# Patient Record
Sex: MALE | Race: WHITE | NOT HISPANIC OR LATINO | Employment: FULL TIME | RURAL
[De-identification: names, ages, dates, MRNs, and addresses within clinical notes are randomized per-mention and may not be internally consistent; named-entity substitution may affect disease eponyms.]

---

## 2017-05-09 PROBLEM — L30.9 PERIANAL DERMATITIS: Chronic | Status: ACTIVE | Noted: 2017-05-09

## 2017-05-09 PROBLEM — Z00.00 WELLNESS EXAMINATION: Status: ACTIVE | Noted: 2017-05-09

## 2017-05-09 PROBLEM — I10 HYPERTENSION: Status: ACTIVE | Noted: 2017-05-09

## 2017-05-09 PROBLEM — G47.33 OBSTRUCTIVE SLEEP APNEA: Status: ACTIVE | Noted: 2017-05-09

## 2017-05-09 PROBLEM — K64.9 HEMORRHOIDS: Status: ACTIVE | Noted: 2017-05-09

## 2017-05-09 PROBLEM — J30.9 ALLERGIC RHINITIS: Chronic | Status: ACTIVE | Noted: 2017-05-09

## 2017-05-09 PROBLEM — M67.40 GANGLION: Status: ACTIVE | Noted: 2017-05-09

## 2017-05-09 PROBLEM — E78.5 HYPERLIPIDEMIA: Chronic | Status: ACTIVE | Noted: 2017-05-09

## 2017-05-09 PROBLEM — H90.5 SENSORINEURAL HEARING LOSS: Chronic | Status: ACTIVE | Noted: 2017-05-09

## 2017-05-09 PROBLEM — K21.9 GASTROESOPHAGEAL REFLUX DISEASE: Chronic | Status: ACTIVE | Noted: 2017-05-09

## 2017-05-09 PROBLEM — D69.6 THROMBOCYTOPENIA (HCC): Status: ACTIVE | Noted: 2017-05-09

## 2017-05-09 PROBLEM — M47.9 DEGENERATIVE JOINT DISEASE OF SPINE: Status: ACTIVE | Noted: 2017-05-09

## 2017-05-09 PROBLEM — N52.9 ERECTILE DYSFUNCTION: Status: ACTIVE | Noted: 2017-05-09

## 2017-05-09 PROBLEM — R73.01 ELEVATED FASTING GLUCOSE: Status: ACTIVE | Noted: 2017-05-09

## 2017-05-10 ENCOUNTER — OFFICE VISIT (OUTPATIENT)
Dept: FAMILY MEDICINE CLINIC | Facility: CLINIC | Age: 42
End: 2017-05-10

## 2017-05-10 VITALS
TEMPERATURE: 98.4 F | SYSTOLIC BLOOD PRESSURE: 108 MMHG | OXYGEN SATURATION: 98 % | BODY MASS INDEX: 22.26 KG/M2 | HEIGHT: 75 IN | HEART RATE: 88 BPM | RESPIRATION RATE: 16 BRPM | WEIGHT: 179 LBS | DIASTOLIC BLOOD PRESSURE: 86 MMHG

## 2017-05-10 DIAGNOSIS — Z51.81 THERAPEUTIC DRUG MONITORING: Primary | ICD-10-CM

## 2017-05-10 PROBLEM — J30.9 ALLERGIC RHINITIS: Chronic | Status: ACTIVE | Noted: 2017-05-10

## 2017-05-10 PROBLEM — Z72.0 TOBACCO USE: Chronic | Status: ACTIVE | Noted: 2017-05-10

## 2017-05-10 PROBLEM — N43.3 HYDROCELE: Status: ACTIVE | Noted: 2017-05-10

## 2017-05-10 PROBLEM — F19.20 CHEMICAL DEPENDENCY (HCC): Status: ACTIVE | Noted: 2017-05-10

## 2017-05-10 PROBLEM — H61.20 EXCESSIVE CERUMEN IN EAR CANAL: Status: ACTIVE | Noted: 2017-05-10

## 2017-05-10 PROBLEM — R22.1 NECK MASS: Status: ACTIVE | Noted: 2017-05-10

## 2017-05-10 PROBLEM — F41.9 ANXIETY: Chronic | Status: ACTIVE | Noted: 2017-05-10

## 2017-05-10 PROCEDURE — 99213 OFFICE O/P EST LOW 20 MIN: CPT | Performed by: FAMILY MEDICINE

## 2017-05-10 RX ORDER — LIDOCAINE 50 MG/G
1 OINTMENT TOPICAL DAILY PRN
Refills: 12 | COMMUNITY
Start: 2017-04-05 | End: 2020-03-09

## 2017-05-10 RX ORDER — BUPRENORPHINE HYDROCHLORIDE, NALOXONE HYDROCHLORIDE 8; 2 MG/1; MG/1
1 FILM, SOLUBLE BUCCAL; SUBLINGUAL DAILY
Refills: 0 | COMMUNITY
Start: 2017-05-02 | End: 2020-03-25

## 2018-11-20 ENCOUNTER — TELEPHONE (OUTPATIENT)
Dept: FAMILY MEDICINE CLINIC | Facility: CLINIC | Age: 43
End: 2018-11-20

## 2018-11-20 RX ORDER — FLUTICASONE PROPIONATE 50 MCG
1 SPRAY, SUSPENSION (ML) NASAL DAILY
Qty: 1 BOTTLE | Refills: 0 | Status: SHIPPED | OUTPATIENT
Start: 2018-11-20 | End: 2020-03-25

## 2018-11-20 RX ORDER — AMOXICILLIN AND CLAVULANATE POTASSIUM 875; 125 MG/1; MG/1
1 TABLET, FILM COATED ORAL 2 TIMES DAILY
Qty: 20 TABLET | Refills: 0 | Status: SHIPPED | OUTPATIENT
Start: 2018-11-20 | End: 2020-03-09

## 2018-11-20 NOTE — TELEPHONE ENCOUNTER
He says he has a sinus infection that won't go away. No fever. His head and chest is congested. Coughing up yellowish green mucous. Taking robitussin and mucinex. Has had this for 1 week. NKDA. Would like something sent to [facility]

## 2018-11-20 NOTE — TELEPHONE ENCOUNTER
Augmentin 875 bid #20  flonase NS one puff each nostril qd  If not better with this; needs to come in

## 2020-03-09 ENCOUNTER — OFFICE VISIT (OUTPATIENT)
Dept: FAMILY MEDICINE CLINIC | Facility: CLINIC | Age: 45
End: 2020-03-09

## 2020-03-09 VITALS
WEIGHT: 180 LBS | BODY MASS INDEX: 22.38 KG/M2 | HEART RATE: 100 BPM | HEIGHT: 75 IN | RESPIRATION RATE: 16 BRPM | SYSTOLIC BLOOD PRESSURE: 120 MMHG | TEMPERATURE: 98.3 F | DIASTOLIC BLOOD PRESSURE: 88 MMHG

## 2020-03-09 DIAGNOSIS — G47.00 INSOMNIA, UNSPECIFIED TYPE: ICD-10-CM

## 2020-03-09 DIAGNOSIS — F19.20 CHEMICAL DEPENDENCY (HCC): ICD-10-CM

## 2020-03-09 DIAGNOSIS — F11.93 NARCOTIC WITHDRAWAL (HCC): ICD-10-CM

## 2020-03-09 PROCEDURE — 99213 OFFICE O/P EST LOW 20 MIN: CPT | Performed by: FAMILY MEDICINE

## 2020-03-09 RX ORDER — CLONAZEPAM 1 MG/1
1 TABLET ORAL 2 TIMES DAILY PRN
Qty: 30 TABLET | Refills: 0 | Status: SHIPPED | OUTPATIENT
Start: 2020-03-09 | End: 2020-03-25 | Stop reason: SDUPTHER

## 2020-03-09 RX ORDER — IBUPROFEN 200 MG
400 TABLET ORAL EVERY 6 HOURS PRN
COMMUNITY
End: 2022-03-07

## 2020-03-09 NOTE — PROGRESS NOTES
"Subjective   Raad Carvajal is a 44 y.o. male presenting with chief complaint of:   Chief Complaint   Patient presents with   • Medication Problem     \"Im on day 16 off suboxone and not doing very well\"       History of Present Illness :  Alone.  Here for primarily an acute issue today; he needs some help with insomnia.  Find it difficult to fall and stay asleep.  2 to 3 weeks ago he decided to stop Suboxone; he was on 60 mg.  He is used that 2 to 3 years.  Prior to that he had a narcotic addiction issue.  He simply tired of using the Suboxone his insurance does not cover it anymore.  He went through a period of mild to moderate anxiety some sweating some loose stools and of course he is having increased difficulty of various discomforts.  But he thinks he is going to be able to tolerate everything eventually.  His primary emphasis is simply having something to help him get some rest at night.    Has multiple chronic problems to consider that might have a bearing on today's issues; not an interval appointment.       Chronic/acute problems reviewed today:   1. Chemical dependency-narcotic: chronic/variable as he is weaning and having symptoms of withdrawal but he says he will not go back to using narcotics.    2. Narcotic withdrawal (CMS/[facility]): see above   3. Insomnia, unspecified type: acute/chronic: denies sleep apnea.  Trouble with falling/staying asleep as more anxious/difficult to relax.  alittle more diffuse pains.      Has an/another acute issue today: non e.    The following portions of the patient's history were reviewed and updated as appropriate: allergies, current medications, past family history, past medical history, past social history, past surgical history and problem list.      Current Outpatient Medications:   •  fluticasone (FLONASE) 50 MCG/ACT nasal spray, 1 spray by Each Nare route Daily., Disp: 1 bottle, Rfl: 0  •  lidocaine (XYLOCAINE) 5 % ointment, Apply 1 application topically Daily As " Needed for Itching or Pain., Disp: , Rfl: 12  •  SUBOXONE 8-2 MG film film, Place 1 film under the tongue Daily., Disp: , Rfl: 0    No problems with medications.  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  ENDO:  No syncope, near or diaphoretic sweaty spells.  HEENT: No head injury occ/same headache.   No vision change.  No hearing loss.  Ears without pain/drainage.  No sore throat.  No change occ nasal/sinus congestion/drainage. No epistaxis.  CHEST: No chest wall tenderness or mass. No change/still smoking occ cough,  without wheeze, SOB; no hemoptysis.  CV: No chest pain, palpatations, ankle edema.  GI: No heartburn,or dysphagia.  No abdominal pain, constipation, rectal bleeding, or melena; recent on/off looser stools.    :  Voids without dysuria, or incontience to completion.  ORTHO: No painful/swollen joints but various on /off sore.  No change daily sore neck or back.  No acute neck or back pain without recent injury.   NEURO: No dizziness, weakness of extremities.  No numbness/parethesias.   PSYCH: No memory loss.  Mood good; occ anxious, depressed but/and not suicidal.  Tolerated stress.     Screening:  Mammogram: NA  Bone density: NA  Low dose CT chest: Tobacco-smoker/age 24/1 ppd  GI: NA  Prostate: NA  Usual lab order  NA    Lab Results:  No results found for this or any previous visit.    A1C:No results for input(s): HGBA1C in the last 78264 hours.  PSA:No results for input(s): PSA in the last 81239 hours.  CBC:No results for input(s): WBC, HGB, HCT, PLT, IRONSERUM, IRON in the last 73607 hours.   BMP/CMP:No results for input(s): NA, K, CL, CO2, GLU, BUN, CREATININE, EGFRIFNONA, EGFRIFAFRI, CALCIUM in the last 91969 hours.  HEPATIC:No results for input(s): ALT, AST, ALKPHOS, TOTAL in the last 41312 hours.    Invalid input(s): HEPATITSC  THYROID:No results for input(s): TSH, T3FREE,  "FTI in the last 75296 hours.    Invalid input(s): T4FREE, T3, T4, TEUP,  TOTALT4    Objective   /88 (BP Location: Right arm, Patient Position: Sitting, Cuff Size: Large Adult)   Pulse 100   Temp 98.3 °F (36.8 °C) (Oral)   Resp 16   Ht 190.5 cm (75\")   Wt 81.6 kg (180 lb)   BMI 22.50 kg/m²   Body mass index is 22.5 kg/m².    Physical Exam  GENERAL:  Well nourished/developed in no acute distress. Thin  SKIN: Turgor excellent, without wound, rash, lesion.  HEENT: Normal cephalic without trauma.  Pupils equal round reactive to light. Extraocular motions full without nystagmus.    No thyroidmegaly, mass, tenderness, lymphadenopathy and supple.  CV: Regular rhythm.  No murmur, gallop, edema. Posterior pulses intact.  No carotid bruits.  CHEST: No chest wall tenderness or mass.   LUNGS: Symmetric motion with clear to auscultation.   ABD: Soft, nontender without mass.   ORTHO: Symmetric extremities without swelling/point tenderness.  Full gross range of motion.  NEURO: CN 2-12 grossly intact.  Symmetric facies.  UE/LE   4/5 strength throughout.  Nonfocal use extremities. Speech clear.    PSYCH: Oriented x 3.  Pleasant calm, well kept.  Purposeful/directed conservation with intact short/long gross memory.     Assessment/Plan     1. Chemical dependency-narcotic    2. Narcotic withdrawal (CMS/[facility])    3. Insomnia, unspecified type        Rx: reviewed/changes:  New Medications Ordered This Visit   Medications   • clonazePAM (KLONOPIN) 1 MG tablet     Sig: Take 1 tablet by mouth 2 (Two) Times a Day As Needed for Seizures.     Dispense:  30 tablet     Refill:  0     LAB/Testing/Referrals: reviewed/orders:   Today:   No orders of the defined types were placed in this encounter.    Chronic/recurrent labs above or change to:   none     Discussions:   Brief use of klonopin; start 1mg may increase 2 mg (avoid sedation)  If goes on very long; more insomnia testing/etc: return  Body mass index is 22.5 kg/m².  Patient's Body " mass index is 22.5 kg/m². BMI is within normal parameters. No follow-up required..      Tobacco use reviewed:    Smoker  Raad Carvajal  reports that he has been smoking cigarettes. He started smoking about 21 years ago. He has been smoking about 1.00 pack per day. He has never used smokeless tobacco.. I have educated him on the risk of diseases from using tobacco products such as cancer, COPD and heart diease.     I advised him to quit and he is not willing to quit.    I spent 1m minutes counseling the patient.        There are no Patient Instructions on file for this visit.    Follow up: Return for will see how testing/or treatment goes and decide;.  Future Appointments   Date Time Provider Department Center   3/25/2020 10:30 AM Jose Angel Shankar MD [facility] None

## 2020-03-25 ENCOUNTER — OFFICE VISIT (OUTPATIENT)
Dept: FAMILY MEDICINE CLINIC | Facility: CLINIC | Age: 45
End: 2020-03-25

## 2020-03-25 VITALS
RESPIRATION RATE: 16 BRPM | HEART RATE: 90 BPM | DIASTOLIC BLOOD PRESSURE: 88 MMHG | TEMPERATURE: 98.2 F | BODY MASS INDEX: 22.38 KG/M2 | HEIGHT: 75 IN | WEIGHT: 180 LBS | SYSTOLIC BLOOD PRESSURE: 122 MMHG | OXYGEN SATURATION: 97 %

## 2020-03-25 DIAGNOSIS — G47.00 INSOMNIA, UNSPECIFIED TYPE: ICD-10-CM

## 2020-03-25 DIAGNOSIS — F41.9 ANXIETY: Chronic | ICD-10-CM

## 2020-03-25 DIAGNOSIS — F19.20 CHEMICAL DEPENDENCY (HCC): Primary | ICD-10-CM

## 2020-03-25 DIAGNOSIS — M54.2 CHRONIC NECK PAIN: ICD-10-CM

## 2020-03-25 DIAGNOSIS — F11.93 NARCOTIC WITHDRAWAL (HCC): ICD-10-CM

## 2020-03-25 DIAGNOSIS — G89.29 CHRONIC NECK PAIN: ICD-10-CM

## 2020-03-25 PROCEDURE — 99214 OFFICE O/P EST MOD 30 MIN: CPT | Performed by: FAMILY MEDICINE

## 2020-03-25 RX ORDER — CLONAZEPAM 1 MG/1
1 TABLET ORAL 2 TIMES DAILY PRN
Qty: 30 TABLET | Refills: 0 | Status: SHIPPED | OUTPATIENT
Start: 2020-03-25 | End: 2021-04-26

## 2020-03-26 NOTE — PROGRESS NOTES
Subjective   Raad Carvajal is a 44 y.o. male presenting with chief complaint of:   Chief Complaint   Patient presents with   • Insomnia     still some trouble sleeping, but is getting better       History of Present Illness :  Alone.  Here for primarily f/u 3.9.20:    1. Chemical dependency-narcotic    2. Narcotic withdrawal (CMS/HCC)    3. Insomnia, unspecified type       He had a previous narcotic problem.  He came off of that and was able to get into the Suboxone program in [place].  He was recently notified his insurance is no longer going to pay for this and he decided he had to stop this.  When seen on 3/9/2020 he was having anxiety insomnia and other symptoms of the withdrawal process.  He did not look admittable; we started him on Klonopin 1 mg twice daily as needed and he has had to use it twice a day.  It was significantly helpful in dealing with his anxiety mild diaphoresis insomnia and bringing him to the point today where he is feeling close to normal.     Has few chronic problems to consider that might have a bearing on today's issues; not an interval appointment.       Chronic/acute problems reviewed today:   1. Chemical dependency-narcotic: chronic/past and as above.  Not using/desiring narcotics right now.     2. Anxiety: acute/chronic and above recently worse.  Better now with Rx   3. Chronic neck pain: : chronic/variable 1-3/10 neck/UE pain with infrequent/no radiation to UE/LE  No change any numbness.  Has bladder/bowel control. No desire to change approach of care.       4. Narcotic withdrawal (CMS/HCC): acute/above. Symptoms as much improved.    5. Insomnia, unspecified type: chronic/variable with above; sleeping well now with Rx.  Apnea denied     Has an/another acute issue today: none.    The following portions of the patient's history were reviewed and updated as appropriate: allergies, current medications, past family history, past medical history, past social history, past surgical  "history and problem list.      Current Outpatient Medications:   •  clonazePAM (KlonoPIN) 1 MG tablet, Take 1 tablet by mouth 2 (Two) Times a Day As Needed for Seizures. Goal; move bid prn to one a day then off, Disp: 30 tablet, Rfl: 0  •  Magnesium Hydroxide (MAGNESIA PO), Take  by mouth Daily. \"dont know the the dose\", Disp: , Rfl:   •  ibuprofen (ADVIL,MOTRIN) 200 MG tablet, Take 400 mg by mouth Every 6 (Six) Hours As Needed for Mild Pain  or Headache., Disp: , Rfl:     No problems with medications.  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  ENDO:  No syncope, near or diaphoretic sweaty spells.  HEENT: No head injury occ/same headache.   No vision change.  No hearing loss.  Ears without pain/drainage.  No sore throat.  No change occ nasal/sinus congestion/drainage. No epistaxis.  CHEST: No chest wall tenderness or mass. No change/still smoking occ cough,  without wheeze, SOB; no hemoptysis.  CV: No chest pain, palpatations, ankle edema.  GI: No heartburn,or dysphagia.  No abdominal pain, constipation, rectal bleeding, or melena; recent on/off looser stools.    :  Voids without dysuria, or incontience to completion.  ORTHO: No painful/swollen joints but various on /off sore.  No change daily sore neck or back.  No acute neck or back pain without recent injury.   NEURO: No dizziness, weakness of extremities.  No numbness/parethesias.   PSYCH: No memory loss.  Mood good; occ anxious, depressed but/and not suicidal.  Tolerated stress.      Screening:  Mammogram: NA  Bone density: NA  Low dose CT chest: Tobacco-smoker/age 24/1 ppd (20): NA  GI: NA  Prostate: NA  Usual lab order  NA    Lab Results:  No results found for this or any previous visit.    A1C:No results for input(s): HGBA1C in the last 81289 hours.  PSA:No results for input(s): PSA in the last 22399 hours.  CBC:No results for " "input(s): WBC, HGB, HCT, PLT, IRONSERUM, IRON in the last 20996 hours.   BMP/CMP:No results for input(s): NA, K, CL, CO2, GLU, BUN, CREATININE, EGFRIFNONA, EGFRIFAFRI, CALCIUM in the last 52301 hours.  HEPATIC:No results for input(s): ALT, AST, ALKPHOS, TOTAL in the last 92305 hours.    Invalid input(s): HEPATITSC  THYROID:No results for input(s): TSH, T3FREE, FTI in the last 16993 hours.    Invalid input(s): T4FREE, T3, T4, TEUP,  TOTALT4    Objective   /88   Pulse 90   Temp 98.2 °F (36.8 °C)   Resp 16   Ht 190.5 cm (75\")   Wt 81.6 kg (180 lb)   SpO2 97%   BMI 22.50 kg/m²   Body mass index is 22.5 kg/m².    Physical Exam  GENERAL:  Well nourished/developed in no acute distress. Thin  SKIN: Turgor excellent, without wound, rash, lesion.  HEENT: Normal cephalic without trauma.  Pupils equal round reactive to light. Extraocular motions full without nystagmus.    No thyroidmegaly, mass, tenderness, lymphadenopathy and supple.  CV: Regular rhythm.  No murmur, gallop, edema. Posterior pulses intact.  No carotid bruits.  CHEST: No chest wall tenderness or mass.   LUNGS: Symmetric motion with clear to auscultation.   ABD: Soft, nontender without mass.   ORTHO: Symmetric extremities without swelling/point tenderness.  Full gross range of motion.  NEURO: CN 2-12 grossly intact.  Symmetric facies.  UE/LE   4/5 strength throughout.  Nonfocal use extremities. Speech clear.    PSYCH: Oriented x 3.  Pleasant calm, well kept.  Purposeful/directed conservation with intact short/long gross memory.     Assessment/Plan     1. Chemical dependency-narcotic    2. Anxiety    3. Chronic neck pain    4. Narcotic withdrawal (CMS/HCC)    5. Insomnia, unspecified type        Rx: reviewed/changes:  New Medications Ordered This Visit   Medications   • clonazePAM (KlonoPIN) 1 MG tablet     Sig: Take 1 tablet by mouth 2 (Two) Times a Day As Needed for Seizures. Goal; move bid prn to one a day then off     Dispense:  30 tablet     " Refill:  0     LAB/Testing/Referrals: reviewed/orders:   Today: none  No orders of the defined types were placed in this encounter.    Chronic/recurrent labs above or change to:   none      Discussions:   Goal; wean Klonopin slowly  Body mass index is 22.5 kg/m².  Patient's Body mass index is 22.5 kg/m². BMI is within normal parameters. No follow-up required..    Tobacco use reviewed:    Smoker  Raad Carvajal  reports that he has been smoking cigarettes. He started smoking about 21 years ago. He has been smoking about 1.00 pack per day. He has never used smokeless tobacco.. I have educated him on the risk of diseases from using tobacco products such as cancer, COPD and heart diease.     I advised him to quit and he is not willing to quit.    I spent mentioned today as discussed many times before minutes counseling the patient.    There are no Patient Instructions on file for this visit.    Follow up: Return for Dr Shankar-, as needed;.  No future appointments.

## 2020-05-18 ENCOUNTER — TELEMEDICINE (OUTPATIENT)
Dept: FAMILY MEDICINE CLINIC | Facility: CLINIC | Age: 45
End: 2020-05-18

## 2020-05-18 DIAGNOSIS — S02.5XXA CLOSED FRACTURE OF TOOTH, INITIAL ENCOUNTER: Primary | ICD-10-CM

## 2020-05-18 DIAGNOSIS — K04.7 DENTAL INFECTION: ICD-10-CM

## 2020-05-18 PROCEDURE — 99213 OFFICE O/P EST LOW 20 MIN: CPT | Performed by: NURSE PRACTITIONER

## 2020-05-18 RX ORDER — CLINDAMYCIN HYDROCHLORIDE 300 MG/1
300 CAPSULE ORAL 3 TIMES DAILY
Qty: 30 CAPSULE | Refills: 0 | Status: SHIPPED | OUTPATIENT
Start: 2020-05-18 | End: 2020-05-28

## 2020-05-18 NOTE — PROGRESS NOTES
"Subjective   Chief Complaint:  Dental issue    History of Present Illness:  This 44 y.o. male was seen via telemedicine MyChart video conference today for left lower gum swelling with a fractured tooth.  He reports that his dentist has been out of service due to Covid 19.  Reports an acute flareup 2-3 days ago and has been using Motrin with good pain relief but still has an underlying dental abscess and fractured tooth.  He is requesting a referral to get it cut out.    No Known Allergies   Current Outpatient Medications on File Prior to Visit   Medication Sig   • clonazePAM (KlonoPIN) 1 MG tablet Take 1 tablet by mouth 2 (Two) Times a Day As Needed for Seizures. Goal; move bid prn to one a day then off   • ibuprofen (ADVIL,MOTRIN) 200 MG tablet Take 400 mg by mouth Every 6 (Six) Hours As Needed for Mild Pain  or Headache.   • Magnesium Hydroxide (MAGNESIA PO) Take  by mouth Daily. \"dont know the the dose\"     No current facility-administered medications on file prior to visit.       Past Medical, Surgical, Social, and Family History:  No past medical history on file.  No past surgical history on file.  Social History     Socioeconomic History   • Marital status: Single     Spouse name: Not on file   • Number of children: 0   • Years of education: 16   • Highest education level: Not on file   Occupational History   • Occupation: farmer   Tobacco Use   • Smoking status: Current Every Day Smoker     Packs/day: 1.00     Types: Cigarettes     Start date: 9/4/1998   • Smokeless tobacco: Never Used   Substance and Sexual Activity   • Alcohol use: No   • Drug use: No   • Sexual activity: Not Currently     No family history on file.  Review of Systems   Constitutional: Negative for chills and fever.   HENT: Positive for dental problem.    Respiratory: Negative for cough and shortness of breath.    Cardiovascular: Negative for chest pain and palpitations.   Musculoskeletal: Negative for arthralgias and myalgias. "     Objective   Physical Exam  Assessment/Plan   Diagnoses and all orders for this visit:    1. Closed fracture of tooth, initial encounter (Primary)  -     Ambulatory Referral to Oral Maxillofacial Surgery    2. Dental infection  -     Ambulatory Referral to Oral Maxillofacial Surgery  -     clindamycin (CLEOCIN) 300 MG capsule; Take 1 capsule by mouth 3 (Three) Times a Day for 10 days.  Dispense: 30 capsule; Refill: 0    Discussion:  Advised and educated plan of care.  Referral to oral surgery, advised to keep regular dentist informed when he is back in service, antibiotics sent to take care of the abscess to buy time to get to an appointment.    Follow-up:  No follow-ups on file.    Note e-Signed by NELSY Mcrae on 05/18/2020 at 10:37 AM

## 2021-04-26 ENCOUNTER — OFFICE VISIT (OUTPATIENT)
Dept: FAMILY MEDICINE CLINIC | Facility: CLINIC | Age: 46
End: 2021-04-26

## 2021-04-26 ENCOUNTER — TELEPHONE (OUTPATIENT)
Dept: FAMILY MEDICINE CLINIC | Facility: CLINIC | Age: 46
End: 2021-04-26

## 2021-04-26 VITALS
SYSTOLIC BLOOD PRESSURE: 138 MMHG | HEIGHT: 75 IN | DIASTOLIC BLOOD PRESSURE: 82 MMHG | HEART RATE: 77 BPM | TEMPERATURE: 97.7 F | RESPIRATION RATE: 16 BRPM | WEIGHT: 201 LBS | OXYGEN SATURATION: 99 % | BODY MASS INDEX: 24.99 KG/M2

## 2021-04-26 DIAGNOSIS — K04.7 DENTAL INFECTION: Primary | ICD-10-CM

## 2021-04-26 DIAGNOSIS — K02.9 DENTAL CARIES: ICD-10-CM

## 2021-04-26 PROCEDURE — 99213 OFFICE O/P EST LOW 20 MIN: CPT | Performed by: NURSE PRACTITIONER

## 2021-04-26 RX ORDER — CLINDAMYCIN HYDROCHLORIDE 300 MG/1
300 CAPSULE ORAL 3 TIMES DAILY
Qty: 30 CAPSULE | Refills: 0 | Status: SHIPPED | OUTPATIENT
Start: 2021-04-26 | End: 2021-05-06

## 2021-04-26 RX ORDER — AMOXICILLIN 500 MG/1
1000 CAPSULE ORAL 3 TIMES DAILY
Qty: 21 CAPSULE | Refills: 0 | Status: SHIPPED | OUTPATIENT
Start: 2021-04-26 | End: 2022-03-07

## 2021-04-26 NOTE — PROGRESS NOTES
"Subjective   Chief Complaint:  Gum and teeth pain    History of Present Illness:  This 45 y.o. male was seen in the office today.  He reports lower left gum started swelling last Saturday night.  He has a history of dental caries, he goes to the dentist in a couple days.    No Known Allergies   Current Outpatient Medications on File Prior to Visit   Medication Sig   • ibuprofen (ADVIL,MOTRIN) 200 MG tablet Take 400 mg by mouth Every 6 (Six) Hours As Needed for Mild Pain  or Headache.   • amoxicillin (AMOXIL) 500 MG capsule Take 2 capsules by mouth 3 (Three) Times a Day.   • [DISCONTINUED] clonazePAM (KlonoPIN) 1 MG tablet Take 1 tablet by mouth 2 (Two) Times a Day As Needed for Seizures. Goal; move bid prn to one a day then off   • [DISCONTINUED] Magnesium Hydroxide (MAGNESIA PO) Take  by mouth Daily. \"dont know the the dose\"     No current facility-administered medications on file prior to visit.      Past Medical, Surgical, Social, and Family History:  History reviewed. No pertinent past medical history.  History reviewed. No pertinent surgical history.  Social History     Socioeconomic History   • Marital status: Single     Spouse name: Not on file   • Number of children: 0   • Years of education: 16   • Highest education level: Not on file   Tobacco Use   • Smoking status: Current Every Day Smoker     Packs/day: 1.00     Types: Cigarettes     Start date: 9/4/1998   • Smokeless tobacco: Never Used   Substance and Sexual Activity   • Alcohol use: No   • Drug use: No   • Sexual activity: Not Currently     History reviewed. No pertinent family history.  Objective   Physical Exam  Constitutional:       General: He is not in acute distress.  HENT:      Mouth/Throat:      Dentition: Dental tenderness and dental caries present.   Cardiovascular:      Rate and Rhythm: Normal rate and regular rhythm.   Pulmonary:      Effort: Pulmonary effort is normal.      Breath sounds: Normal breath sounds.   Neurological:      Mental " "Status: He is alert.     /82 (BP Location: Left arm)   Pulse 77   Temp 97.7 °F (36.5 °C)   Resp 16   Ht 190.5 cm (75\")   Wt 91.2 kg (201 lb)   SpO2 99%   BMI 25.12 kg/m²     Assessment/Plan   Diagnoses and all orders for this visit:    1. Dental infection (Primary)  -     clindamycin (CLEOCIN) 300 MG capsule; Take 1 capsule by mouth 3 (Three) Times a Day for 10 days.  Dispense: 30 capsule; Refill: 0    2. Dental caries  -     clindamycin (CLEOCIN) 300 MG capsule; Take 1 capsule by mouth 3 (Three) Times a Day for 10 days.  Dispense: 30 capsule; Refill: 0    Discussion:  Advised and educated plan of care.  We will treat underlying infections, advised to follow-up with dentist as ordered.    Follow-up:  Return if symptoms worsen or fail to improve.    Electronically signed by NELSY Mcrae, 04/26/21, 3:26 PM CDT.  "

## 2022-03-07 ENCOUNTER — OFFICE VISIT (OUTPATIENT)
Dept: FAMILY MEDICINE CLINIC | Facility: CLINIC | Age: 47
End: 2022-03-07

## 2022-03-07 VITALS
HEIGHT: 75 IN | SYSTOLIC BLOOD PRESSURE: 142 MMHG | BODY MASS INDEX: 25.12 KG/M2 | OXYGEN SATURATION: 98 % | DIASTOLIC BLOOD PRESSURE: 86 MMHG | RESPIRATION RATE: 16 BRPM | HEART RATE: 76 BPM | TEMPERATURE: 97.8 F | WEIGHT: 202 LBS

## 2022-03-07 DIAGNOSIS — K04.7 DENTAL INFECTION: Primary | ICD-10-CM

## 2022-03-07 DIAGNOSIS — K02.9 DENTAL CARIES: ICD-10-CM

## 2022-03-07 PROCEDURE — 99213 OFFICE O/P EST LOW 20 MIN: CPT | Performed by: NURSE PRACTITIONER

## 2022-03-07 RX ORDER — CLINDAMYCIN HYDROCHLORIDE 300 MG/1
300 CAPSULE ORAL 3 TIMES DAILY
Qty: 30 CAPSULE | Refills: 0 | Status: SHIPPED | OUTPATIENT
Start: 2022-03-07 | End: 2022-03-17

## 2022-03-07 NOTE — PROGRESS NOTES
Subjective   Chief Complaint:  Flareup of dental problems    History of Present Illness:  This 46 y.o. male was seen in the office today.  He presents today with a flareup of dental problems.  He has a history of dental caries and frequent infections.  He does have plans of getting to a dentist very soon already.  He has taken Motrin for pain-declining any further assistance with pain.  He is mostly concerned about there being infection prior to getting dental work done.    No Known Allergies   Current Outpatient Medications on File Prior to Visit   Medication Sig   • [DISCONTINUED] amoxicillin (AMOXIL) 500 MG capsule Take 2 capsules by mouth 3 (Three) Times a Day.   • [DISCONTINUED] ibuprofen (ADVIL,MOTRIN) 200 MG tablet Take 400 mg by mouth Every 6 (Six) Hours As Needed for Mild Pain  or Headache.     No current facility-administered medications on file prior to visit.      Past Medical, Surgical, Social, and Family History:  No past medical history on file.  No past surgical history on file.  Social History     Socioeconomic History   • Marital status: Single   • Number of children: 0   • Years of education: 16   Tobacco Use   • Smoking status: Current Every Day Smoker     Packs/day: 1.00     Types: Cigarettes     Start date: 9/4/1998   • Smokeless tobacco: Never Used   Substance and Sexual Activity   • Alcohol use: No   • Drug use: No   • Sexual activity: Not Currently     No family history on file.  Objective   Physical Exam  Constitutional:       General: He is not in acute distress.  HENT:      Mouth/Throat:        Comments: Infected gum, fractured tooth  Cardiovascular:      Rate and Rhythm: Normal rate and regular rhythm.      Pulses: Normal pulses.      Heart sounds: No murmur heard.    No friction rub. No gallop.   Pulmonary:      Effort: Pulmonary effort is normal. No respiratory distress.      Breath sounds: Normal breath sounds. No wheezing or rhonchi.   Neurological:      Mental Status: He is alert.  "    /86   Pulse 76   Temp 97.8 °F (36.6 °C)   Resp 16   Ht 190.5 cm (75\")   Wt 91.6 kg (202 lb)   SpO2 98%   BMI 25.25 kg/m²     Assessment/Plan   Diagnoses and all orders for this visit:    1. Dental infection (Primary)  -     clindamycin (CLEOCIN) 300 MG capsule; Take 1 capsule by mouth 3 (Three) Times a Day for 10 days.  Dispense: 30 capsule; Refill: 0    2. Dental caries  -     clindamycin (CLEOCIN) 300 MG capsule; Take 1 capsule by mouth 3 (Three) Times a Day for 10 days.  Dispense: 30 capsule; Refill: 0    Discussion:  Advised and educated plan of care.  Antibiotics to follow-advised to follow-up with dentist ASAP.    Patient's Body mass index is 25.25 kg/m². indicating that he is overweight (BMI 25-29.9). Patient's (Body mass index is 25.25 kg/m².) indicates that they are overweight with health conditions that include none . Weight is unchanged. BMI is is above average; BMI management plan is completed. We discussed portion control and increasing exercise. .    Follow-up:  No follow-ups on file.    Electronically signed by NELSY Mcrae, 03/07/22, 8:39 AM CST.  "

## 2022-03-18 ENCOUNTER — OFFICE VISIT (OUTPATIENT)
Dept: FAMILY MEDICINE CLINIC | Facility: CLINIC | Age: 47
End: 2022-03-18

## 2022-03-18 VITALS
RESPIRATION RATE: 18 BRPM | DIASTOLIC BLOOD PRESSURE: 80 MMHG | OXYGEN SATURATION: 100 % | TEMPERATURE: 97.1 F | HEIGHT: 75 IN | BODY MASS INDEX: 25.74 KG/M2 | HEART RATE: 76 BPM | WEIGHT: 207 LBS | SYSTOLIC BLOOD PRESSURE: 138 MMHG

## 2022-03-18 DIAGNOSIS — J01.90 ACUTE NON-RECURRENT SINUSITIS, UNSPECIFIED LOCATION: Primary | ICD-10-CM

## 2022-03-18 DIAGNOSIS — Z12.11 SCREENING FOR COLON CANCER: ICD-10-CM

## 2022-03-18 PROCEDURE — 99213 OFFICE O/P EST LOW 20 MIN: CPT | Performed by: NURSE PRACTITIONER

## 2022-03-18 RX ORDER — PREDNISONE 20 MG/1
20 TABLET ORAL DAILY
Qty: 5 TABLET | Refills: 0 | Status: SHIPPED | OUTPATIENT
Start: 2022-03-18 | End: 2022-03-23

## 2022-03-18 RX ORDER — AMOXICILLIN AND CLAVULANATE POTASSIUM 875; 125 MG/1; MG/1
1 TABLET, FILM COATED ORAL 2 TIMES DAILY
Qty: 20 TABLET | Refills: 0 | Status: SHIPPED | OUTPATIENT
Start: 2022-03-18 | End: 2022-03-28

## 2022-03-18 NOTE — PROGRESS NOTES
Subjective   Chief Complaint:  Sinus symptoms    History of Present Illness:  This 46 y.o. male was seen in the office today.  He reports sinus pain and congestion and head pressure x3 days.  Reports over-the-counter medications not helping.  Reports cute onset of symptoms x3 days.    No Known Allergies   Current Outpatient Medications on File Prior to Visit   Medication Sig   • [] clindamycin (CLEOCIN) 300 MG capsule Take 1 capsule by mouth 3 (Three) Times a Day for 10 days.     No current facility-administered medications on file prior to visit.      Past Medical, Surgical, Social, and Family History:  History reviewed. No pertinent past medical history.  History reviewed. No pertinent surgical history.  Social History     Socioeconomic History   • Marital status: Single   • Number of children: 0   • Years of education: 16   Tobacco Use   • Smoking status: Current Every Day Smoker     Packs/day: 1.00     Types: Cigarettes     Start date: 1998   • Smokeless tobacco: Never Used   Substance and Sexual Activity   • Alcohol use: No   • Drug use: No   • Sexual activity: Not Currently     History reviewed. No pertinent family history.  Objective   Physical Exam  Constitutional:       General: He is not in acute distress.  HENT:      Right Ear: Hearing, tympanic membrane and ear canal normal.      Left Ear: Hearing, tympanic membrane and ear canal normal.      Nose: Congestion and rhinorrhea present.      Right Sinus: Maxillary sinus tenderness and frontal sinus tenderness present.      Left Sinus: Maxillary sinus tenderness and frontal sinus tenderness present.   Cardiovascular:      Rate and Rhythm: Normal rate and regular rhythm.      Pulses: Normal pulses.      Heart sounds: No murmur heard.    No friction rub. No gallop.   Pulmonary:      Effort: Pulmonary effort is normal. No respiratory distress.      Breath sounds: Normal breath sounds. No wheezing or rhonchi.   Neurological:      Mental Status: He is  "alert.     /80 (BP Location: Right arm)   Pulse 76   Temp 97.1 °F (36.2 °C)   Resp 18   Ht 190.5 cm (75\")   Wt 93.9 kg (207 lb)   SpO2 100%   BMI 25.87 kg/m²     Assessment/Plan   Diagnoses and all orders for this visit:    1. Acute non-recurrent sinusitis, unspecified location (Primary)  -     amoxicillin-clavulanate (Augmentin) 875-125 MG per tablet; Take 1 tablet by mouth 2 (Two) Times a Day for 10 days.  Dispense: 20 tablet; Refill: 0  -     predniSONE (DELTASONE) 20 MG tablet; Take 1 tablet by mouth Daily for 5 days.  Dispense: 5 tablet; Refill: 0    2. Screening for colon cancer  -     Cologuard - Stool, Per Rectum; Future    Discussion:  Advised and educated plan of care.  Antibiotic steroids to follow.  Advised colon cancer screening guidelines-he is agreeable to Cologuard.    Patient's Body mass index is 25.87 kg/m². indicating that he is overweight (BMI 25-29.9). Patient's (Body mass index is 25.87 kg/m².) indicates that they are overweight with health conditions that include none . Weight is unchanged. BMI is is above average; BMI management plan is completed. We discussed portion control and increasing exercise. .    Follow-up:  Return if symptoms worsen or fail to improve.    Electronically signed by NELSY Mcrae, 03/18/22, 10:50 AM CDT.  "

## 2022-10-04 ENCOUNTER — OFFICE VISIT (OUTPATIENT)
Dept: FAMILY MEDICINE CLINIC | Facility: CLINIC | Age: 47
End: 2022-10-04

## 2022-10-04 VITALS
DIASTOLIC BLOOD PRESSURE: 80 MMHG | HEIGHT: 75 IN | HEART RATE: 68 BPM | BODY MASS INDEX: 25.61 KG/M2 | SYSTOLIC BLOOD PRESSURE: 132 MMHG | OXYGEN SATURATION: 100 % | WEIGHT: 206 LBS | RESPIRATION RATE: 16 BRPM

## 2022-10-04 DIAGNOSIS — K02.9 DENTAL CARIES: Primary | ICD-10-CM

## 2022-10-04 DIAGNOSIS — K04.7 DENTAL INFECTION: ICD-10-CM

## 2022-10-04 PROCEDURE — 99213 OFFICE O/P EST LOW 20 MIN: CPT | Performed by: NURSE PRACTITIONER

## 2022-10-04 RX ORDER — CLINDAMYCIN HYDROCHLORIDE 300 MG/1
300 CAPSULE ORAL 3 TIMES DAILY
Qty: 30 CAPSULE | Refills: 0 | Status: SHIPPED | OUTPATIENT
Start: 2022-10-04 | End: 2022-10-14

## 2022-10-04 NOTE — PROGRESS NOTES
Subjective   Chief Complaint:  Broken, infected tooth.    History of Present Illness:  This 47 y.o. male was seen in the office today. The patient is a 47-year-old male who presents to the clinic for evaluation of a broken and infected tooth.     He reports he has a broken and infected tooth that is located to the upper right of his mouth. He states he has a dental appointment scheduled for 10/05/2022 at 1:00 PM.     Additionally, the patient has a history of opioid use. He notes he has not been on Suboxone for approximately 3 years.    No Known Allergies   No current outpatient medications on file prior to visit.     No current facility-administered medications on file prior to visit.      Past Medical, Surgical, Social, and Family History:  History reviewed. No pertinent past medical history.  History reviewed. No pertinent surgical history.  Social History     Socioeconomic History   • Marital status: Single   • Number of children: 0   • Years of education: 16   Tobacco Use   • Smoking status: Current Every Day Smoker     Packs/day: 1.00     Types: Cigarettes     Start date: 9/4/1998   • Smokeless tobacco: Never Used   Substance and Sexual Activity   • Alcohol use: No   • Drug use: No   • Sexual activity: Not Currently     History reviewed. No pertinent family history.    Objective   Physical Exam  Constitutional:       General: He is not in acute distress.  HENT:      Mouth/Throat:      Dentition: Dental caries present.      Comments:  Swelling and inflammation of the upper gum.  Cardiovascular:      Rate and Rhythm: Normal rate and regular rhythm.      Pulses: Normal pulses.      Heart sounds: No murmur heard.    No friction rub. No gallop.   Pulmonary:      Effort: Pulmonary effort is normal. No respiratory distress.      Breath sounds: Normal breath sounds. No wheezing or rhonchi.   Neurological:      Mental Status: He is alert.     /80 (BP Location: Left arm, Patient Position: Sitting, Cuff Size:  "Adult)   Pulse 68   Resp 16   Ht 190.5 cm (75\")   Wt 93.4 kg (206 lb)   SpO2 100%   BMI 25.75 kg/m²     Assessment & Plan   Diagnoses and all orders for this visit:    1. Dental caries (Primary)  -     clindamycin (CLEOCIN) 300 MG capsule; Take 1 capsule by mouth 3 (Three) Times a Day for 10 days.  Dispense: 30 capsule; Refill: 0    2. Dental infection  -     clindamycin (CLEOCIN) 300 MG capsule; Take 1 capsule by mouth 3 (Three) Times a Day for 10 days.  Dispense: 30 capsule; Refill: 0    Discussion:  Advised and educated plan of care. The patient was prescribed clindamycin to cover the infection of the gum and he is advised to disclose that information to dental care during his appointment scheduled for 10/05/2022. I recommended he take Tylenol as needed for pain relief.     Follow-up:  Return if symptoms worsen or fail to improve.    Transcribed from ambient dictation for NELSY Mcrae by Micaela Byrd.  10/04/22   12:21 CDT            "

## 2022-10-31 ENCOUNTER — OFFICE VISIT (OUTPATIENT)
Dept: FAMILY MEDICINE CLINIC | Facility: CLINIC | Age: 47
End: 2022-10-31

## 2022-10-31 VITALS
WEIGHT: 206 LBS | HEART RATE: 80 BPM | BODY MASS INDEX: 25.61 KG/M2 | OXYGEN SATURATION: 99 % | HEIGHT: 75 IN | TEMPERATURE: 100.2 F | RESPIRATION RATE: 12 BRPM

## 2022-10-31 DIAGNOSIS — U07.1 COVID-19: Primary | ICD-10-CM

## 2022-10-31 DIAGNOSIS — R50.9 FEVER, UNSPECIFIED FEVER CAUSE: ICD-10-CM

## 2022-10-31 DIAGNOSIS — Z20.822 SUSPECTED COVID-19 VIRUS INFECTION: ICD-10-CM

## 2022-10-31 LAB
EXPIRATION DATE: ABNORMAL
EXPIRATION DATE: NORMAL
FLUAV AG NPH QL: NEGATIVE
FLUBV AG NPH QL: NEGATIVE
INTERNAL CONTROL: ABNORMAL
INTERNAL CONTROL: NORMAL
Lab: ABNORMAL
Lab: NORMAL
SARS-COV-2 AG UPPER RESP QL IA.RAPID: DETECTED

## 2022-10-31 PROCEDURE — 87804 INFLUENZA ASSAY W/OPTIC: CPT | Performed by: NURSE PRACTITIONER

## 2022-10-31 PROCEDURE — 87426 SARSCOV CORONAVIRUS AG IA: CPT | Performed by: NURSE PRACTITIONER

## 2022-10-31 PROCEDURE — 99213 OFFICE O/P EST LOW 20 MIN: CPT | Performed by: NURSE PRACTITIONER

## 2022-10-31 NOTE — PROGRESS NOTES
"Subjective   Chief Complaint:  Evaluation of Respiratory Symptoms    History of Present Illness:  This 47 y.o. male presents to the outside clinic with complaints of low-grade fever, cough, and body aches. The acute onset was 4-5 days ago with exposure to COVID-19 reported. He is vaccinated with Moderna x2 and boosted with Moderna x1.    No Known Allergies   No current outpatient medications on file prior to visit.     No current facility-administered medications on file prior to visit.      Past Medical, Surgical, Social, and Family History:  History reviewed. No pertinent past medical history.  History reviewed. No pertinent surgical history.  Social History     Socioeconomic History   • Marital status: Single   • Number of children: 0   • Years of education: 16   Tobacco Use   • Smoking status: Every Day     Packs/day: 1.00     Types: Cigarettes     Start date: 9/4/1998   • Smokeless tobacco: Never   Substance and Sexual Activity   • Alcohol use: No   • Drug use: No   • Sexual activity: Not Currently     History reviewed. No pertinent family history.  Objective   Covid Precautions Maintained  Physical Exam  Constitutional:       General: He is not in acute distress.     Comments: Appears tired.   Pulmonary:      Effort: Pulmonary effort is normal. No respiratory distress.   Neurological:      Mental Status: He is alert.     Pulse 80   Temp 100.2 °F (37.9 °C)   Resp 12   Ht 190.5 cm (75\")   Wt 93.4 kg (206 lb)   SpO2 99%   BMI 25.75 kg/m²     Assessment & Plan   Diagnoses and all orders for this visit:    1. COVID-19 (Primary)    2. Suspected COVID-19 virus infection  -     POCT VERITOR SARS-CoV-2 Antigen    3. Fever, unspecified fever cause  -     POC Influenza A / B    Discussion:  Advised and educated plan of care.   The patient is positive for COVID-19 and home supportive care is advised. He has been made aware of quarantine guidelines and has declined a work excuse.    Follow-up:  Return if symptoms " worsen or fail to improve.    Electronically signed by Elysia Buckley, 10/31/22, 10:17 AM CDT.

## 2023-02-26 ENCOUNTER — HOSPITAL ENCOUNTER (EMERGENCY)
Age: 48
Discharge: HOME | End: 2023-02-26
Attending: EMERGENCY MEDICINE
Payer: COMMERCIAL

## 2023-02-26 VITALS
DIASTOLIC BLOOD PRESSURE: 95 MMHG | OXYGEN SATURATION: 98 % | SYSTOLIC BLOOD PRESSURE: 142 MMHG | HEART RATE: 82 BPM | WEIGHT: 210 LBS | RESPIRATION RATE: 18 BRPM | TEMPERATURE: 98 F

## 2023-02-26 DIAGNOSIS — K62.5 RECTAL BLEEDING: Primary | ICD-10-CM

## 2023-02-26 DIAGNOSIS — K64.8 INTERNAL HEMORRHOIDS: ICD-10-CM

## 2023-02-26 LAB
ALBUMIN SERPL-MCNC: 4.5 G/DL (ref 3.5–5.2)
ALP BLD-CCNC: 55 U/L (ref 40–130)
ALT SERPL-CCNC: 25 U/L (ref 5–41)
ANION GAP SERPL CALCULATED.3IONS-SCNC: 15 MMOL/L (ref 7–19)
AST SERPL-CCNC: 19 U/L (ref 5–40)
BASOPHILS ABSOLUTE: 0.1 K/UL (ref 0–0.2)
BASOPHILS RELATIVE PERCENT: 0.6 % (ref 0–1)
BILIRUB SERPL-MCNC: 0.6 MG/DL (ref 0.2–1.2)
BUN BLDV-MCNC: 12 MG/DL (ref 6–20)
CALCIUM SERPL-MCNC: 9.6 MG/DL (ref 8.6–10)
CHLORIDE BLD-SCNC: 101 MMOL/L (ref 98–111)
CO2: 21 MMOL/L (ref 22–29)
CREAT SERPL-MCNC: 1.3 MG/DL (ref 0.5–1.2)
EOSINOPHILS ABSOLUTE: 0.4 K/UL (ref 0–0.6)
EOSINOPHILS RELATIVE PERCENT: 3.6 % (ref 0–5)
GFR SERPL CREATININE-BSD FRML MDRD: >60 ML/MIN/{1.73_M2}
GLUCOSE BLD-MCNC: 135 MG/DL (ref 74–109)
HCT VFR BLD CALC: 49 % (ref 42–52)
HEMOGLOBIN: 16.8 G/DL (ref 14–18)
IMMATURE GRANULOCYTES #: 0 K/UL
LYMPHOCYTES ABSOLUTE: 2.8 K/UL (ref 1.1–4.5)
LYMPHOCYTES RELATIVE PERCENT: 25.1 % (ref 20–40)
MCH RBC QN AUTO: 31.5 PG (ref 27–31)
MCHC RBC AUTO-ENTMCNC: 34.3 G/DL (ref 33–37)
MCV RBC AUTO: 91.9 FL (ref 80–94)
MONOCYTES ABSOLUTE: 0.6 K/UL (ref 0–0.9)
MONOCYTES RELATIVE PERCENT: 5.3 % (ref 0–10)
NEUTROPHILS ABSOLUTE: 7.3 K/UL (ref 1.5–7.5)
NEUTROPHILS RELATIVE PERCENT: 65 % (ref 50–65)
PDW BLD-RTO: 12.6 % (ref 11.5–14.5)
PLATELET # BLD: 253 K/UL (ref 130–400)
PMV BLD AUTO: 10.4 FL (ref 9.4–12.4)
POTASSIUM SERPL-SCNC: 3.6 MMOL/L (ref 3.5–5)
RBC # BLD: 5.33 M/UL (ref 4.7–6.1)
SODIUM BLD-SCNC: 137 MMOL/L (ref 136–145)
TOTAL PROTEIN: 6.9 G/DL (ref 6.6–8.7)
WBC # BLD: 11.3 K/UL (ref 4.8–10.8)

## 2023-02-26 PROCEDURE — 99283 EMERGENCY DEPT VISIT LOW MDM: CPT

## 2023-02-26 PROCEDURE — 85025 COMPLETE CBC W/AUTO DIFF WBC: CPT

## 2023-02-26 PROCEDURE — 80053 COMPREHEN METABOLIC PANEL: CPT

## 2023-02-26 PROCEDURE — 36415 COLL VENOUS BLD VENIPUNCTURE: CPT

## 2023-02-26 RX ORDER — HYDROCORTISONE ACETATE 25 MG/1
25 SUPPOSITORY RECTAL 2 TIMES DAILY
Qty: 14 SUPPOSITORY | Refills: 0 | Status: SHIPPED | OUTPATIENT
Start: 2023-02-26 | End: 2023-02-26 | Stop reason: SDUPTHER

## 2023-02-26 RX ORDER — HYDROCORTISONE ACETATE 25 MG/1
25 SUPPOSITORY RECTAL 2 TIMES DAILY
Qty: 14 SUPPOSITORY | Refills: 0 | Status: SHIPPED | OUTPATIENT
Start: 2023-02-26

## 2023-02-26 ASSESSMENT — ENCOUNTER SYMPTOMS
ANAL BLEEDING: 1
DIARRHEA: 0
ABDOMINAL PAIN: 0
VOMITING: 0
NAUSEA: 0
RECTAL PAIN: 0
COUGH: 0
SHORTNESS OF BREATH: 0
BLOOD IN STOOL: 0
SORE THROAT: 0

## 2023-02-26 ASSESSMENT — PAIN - FUNCTIONAL ASSESSMENT: PAIN_FUNCTIONAL_ASSESSMENT: NONE - DENIES PAIN

## 2023-02-26 NOTE — ED NOTES
Chaperone provided with Dr. Dennis Coleman for rectal exam      Catherine Streeter RN  02/26/23 9451
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
## 2023-03-02 ENCOUNTER — OFFICE VISIT (OUTPATIENT)
Dept: FAMILY MEDICINE CLINIC | Facility: CLINIC | Age: 48
End: 2023-03-02
Payer: COMMERCIAL

## 2023-03-02 VITALS
HEIGHT: 75 IN | HEART RATE: 65 BPM | OXYGEN SATURATION: 98 % | RESPIRATION RATE: 18 BRPM | BODY MASS INDEX: 26.06 KG/M2 | TEMPERATURE: 97.3 F | DIASTOLIC BLOOD PRESSURE: 76 MMHG | WEIGHT: 209.6 LBS | SYSTOLIC BLOOD PRESSURE: 130 MMHG

## 2023-03-02 DIAGNOSIS — S00.95XA FOREIGN BODY HEAD, INITIAL ENCOUNTER: ICD-10-CM

## 2023-03-02 DIAGNOSIS — Z00.00 WELLNESS EXAMINATION: ICD-10-CM

## 2023-03-02 DIAGNOSIS — K92.1 BLOOD IN STOOL: ICD-10-CM

## 2023-03-02 DIAGNOSIS — Z12.11 SCREENING FOR COLON CANCER: Primary | ICD-10-CM

## 2023-03-02 PROCEDURE — 99214 OFFICE O/P EST MOD 30 MIN: CPT | Performed by: NURSE PRACTITIONER

## 2023-03-02 RX ORDER — HYDROCORTISONE ACETATE 25 MG/1
25 SUPPOSITORY RECTAL
COMMUNITY
Start: 2023-02-26

## 2023-03-02 RX ORDER — HYDROCORTISONE ACETATE 25 MG
SUPPOSITORY, RECTAL RECTAL
COMMUNITY
Start: 2023-02-26
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
## 2023-03-09 ENCOUNTER — TELEPHONE (OUTPATIENT)
Dept: FAMILY MEDICINE CLINIC | Facility: CLINIC | Age: 48
End: 2023-03-09

## 2023-03-09 ENCOUNTER — HOSPITAL ENCOUNTER (EMERGENCY)
Age: 48
Discharge: HOME | End: 2023-03-09
Attending: EMERGENCY MEDICINE
Payer: COMMERCIAL

## 2023-03-09 ENCOUNTER — APPOINTMENT (OUTPATIENT)
Dept: GENERAL RADIOLOGY | Age: 48
End: 2023-03-09
Payer: COMMERCIAL

## 2023-03-09 ENCOUNTER — APPOINTMENT (OUTPATIENT)
Dept: CT IMAGING | Age: 48
End: 2023-03-09
Payer: COMMERCIAL

## 2023-03-09 VITALS
SYSTOLIC BLOOD PRESSURE: 130 MMHG | OXYGEN SATURATION: 99 % | RESPIRATION RATE: 17 BRPM | WEIGHT: 210 LBS | TEMPERATURE: 97.7 F | HEIGHT: 74 IN | HEART RATE: 69 BPM | BODY MASS INDEX: 26.95 KG/M2 | DIASTOLIC BLOOD PRESSURE: 87 MMHG

## 2023-03-09 DIAGNOSIS — R07.89 MUSCULOSKELETAL CHEST PAIN: Primary | ICD-10-CM

## 2023-03-09 DIAGNOSIS — R31.9 HEMATURIA, UNSPECIFIED TYPE: ICD-10-CM

## 2023-03-09 LAB
ALBUMIN SERPL-MCNC: 4.3 G/DL (ref 3.5–5.2)
ALP BLD-CCNC: 51 U/L (ref 40–130)
ALT SERPL-CCNC: 18 U/L (ref 5–41)
ANION GAP SERPL CALCULATED.3IONS-SCNC: 14 MMOL/L (ref 7–19)
AST SERPL-CCNC: 18 U/L (ref 5–40)
BASOPHILS ABSOLUTE: 0.1 K/UL (ref 0–0.2)
BASOPHILS RELATIVE PERCENT: 0.6 % (ref 0–1)
BILIRUB SERPL-MCNC: 0.7 MG/DL (ref 0.2–1.2)
BILIRUBIN URINE: NEGATIVE
BLOOD, URINE: NEGATIVE
BUN BLDV-MCNC: 11 MG/DL (ref 6–20)
CALCIUM SERPL-MCNC: 9.9 MG/DL (ref 8.6–10)
CHLORIDE BLD-SCNC: 105 MMOL/L (ref 98–111)
CLARITY: ABNORMAL
CO2: 19 MMOL/L (ref 22–29)
COLOR: YELLOW
CREAT SERPL-MCNC: 1.3 MG/DL (ref 0.5–1.2)
EOSINOPHILS ABSOLUTE: 0.1 K/UL (ref 0–0.6)
EOSINOPHILS RELATIVE PERCENT: 1.1 % (ref 0–5)
GFR SERPL CREATININE-BSD FRML MDRD: >60 ML/MIN/{1.73_M2}
GLUCOSE BLD-MCNC: 113 MG/DL (ref 74–109)
GLUCOSE URINE: NEGATIVE MG/DL
HCT VFR BLD CALC: 43.8 % (ref 42–52)
HEMOGLOBIN: 15.3 G/DL (ref 14–18)
IMMATURE GRANULOCYTES #: 0 K/UL
KETONES, URINE: 15 MG/DL
LEUKOCYTE ESTERASE, URINE: NEGATIVE
LYMPHOCYTES ABSOLUTE: 1.4 K/UL (ref 1.1–4.5)
LYMPHOCYTES RELATIVE PERCENT: 16 % (ref 20–40)
MCH RBC QN AUTO: 31.7 PG (ref 27–31)
MCHC RBC AUTO-ENTMCNC: 34.9 G/DL (ref 33–37)
MCV RBC AUTO: 90.9 FL (ref 80–94)
MONOCYTES ABSOLUTE: 0.5 K/UL (ref 0–0.9)
MONOCYTES RELATIVE PERCENT: 5.9 % (ref 0–10)
NEUTROPHILS ABSOLUTE: 6.8 K/UL (ref 1.5–7.5)
NEUTROPHILS RELATIVE PERCENT: 76.1 % (ref 50–65)
NITRITE, URINE: NEGATIVE
PDW BLD-RTO: 12.5 % (ref 11.5–14.5)
PH UA: 8.5 (ref 5–8)
PLATELET # BLD: 265 K/UL (ref 130–400)
PMV BLD AUTO: 10.6 FL (ref 9.4–12.4)
POTASSIUM SERPL-SCNC: 3.6 MMOL/L (ref 3.5–5)
PROTEIN UA: NEGATIVE MG/DL
RBC # BLD: 4.82 M/UL (ref 4.7–6.1)
SODIUM BLD-SCNC: 138 MMOL/L (ref 136–145)
SPECIFIC GRAVITY UA: 1.01 (ref 1–1.03)
TOTAL PROTEIN: 6.7 G/DL (ref 6.6–8.7)
TROPONIN: <0.01 NG/ML (ref 0–0.03)
UROBILINOGEN, URINE: 0.2 E.U./DL
WBC # BLD: 8.9 K/UL (ref 4.8–10.8)

## 2023-03-09 PROCEDURE — 71045 X-RAY EXAM CHEST 1 VIEW: CPT

## 2023-03-09 PROCEDURE — 84484 ASSAY OF TROPONIN QUANT: CPT

## 2023-03-09 PROCEDURE — 74150 CT ABDOMEN W/O CONTRAST: CPT

## 2023-03-09 PROCEDURE — 80053 COMPREHEN METABOLIC PANEL: CPT

## 2023-03-09 PROCEDURE — 93005 ELECTROCARDIOGRAM TRACING: CPT | Performed by: EMERGENCY MEDICINE

## 2023-03-09 PROCEDURE — 36415 COLL VENOUS BLD VENIPUNCTURE: CPT

## 2023-03-09 PROCEDURE — 99285 EMERGENCY DEPT VISIT HI MDM: CPT

## 2023-03-09 PROCEDURE — 85025 COMPLETE CBC W/AUTO DIFF WBC: CPT

## 2023-03-09 PROCEDURE — 81003 URINALYSIS AUTO W/O SCOPE: CPT

## 2023-03-09 RX ORDER — ASPIRIN 81 MG/1
81 TABLET ORAL DAILY
Qty: 30 TABLET | Refills: 0 | Status: SHIPPED | OUTPATIENT
Start: 2023-03-09

## 2023-03-09 RX ORDER — CYCLOBENZAPRINE HCL 10 MG
10 TABLET ORAL 2 TIMES DAILY PRN
Qty: 10 TABLET | Refills: 0 | Status: SHIPPED | OUTPATIENT
Start: 2023-03-09 | End: 2023-03-19

## 2023-03-09 RX ORDER — NAPROXEN 500 MG/1
500 TABLET ORAL 2 TIMES DAILY WITH MEALS
Qty: 14 TABLET | Refills: 0 | Status: SHIPPED | OUTPATIENT
Start: 2023-03-09 | End: 2023-03-16

## 2023-03-09 ASSESSMENT — ENCOUNTER SYMPTOMS
RESPIRATORY NEGATIVE: 1
GASTROINTESTINAL NEGATIVE: 1
EYES NEGATIVE: 1

## 2023-03-09 ASSESSMENT — PAIN - FUNCTIONAL ASSESSMENT: PAIN_FUNCTIONAL_ASSESSMENT: NONE - DENIES PAIN

## 2023-03-09 NOTE — TELEPHONE ENCOUNTER
Pt presented to the office to check in, pt was shaky and short of breath, I asked how bad his pain was, he stated it was really bad, I then asked if it was going down his arm and he said yes, at this point I notified the nurse for Dr. Shankar and the practice manager.

## 2023-03-09 NOTE — ED PROVIDER NOTES
[facility] EMERGENCY DEPT  EMERGENCY DEPARTMENT ENCOUNTER      Pt Name: John Xiao  MRN: [id number]  [place] 1975  Date of evaluation: 3/9/2023  Provider: Phan Ponce MD    CHIEF COMPLAINT       Chief Complaint   Patient presents with    Chest Pain     Pt arrives via EMS from PCP office. Pt reports having left side cp, after experiencing a BM. Pt had rectal bleeding approx 11 days ago and had been constipated since. EMS gave 4 zofran, Pcp gave 324 asa. HISTORY OF PRESENT ILLNESS   (Location/Symptom, Timing/Onset,Context/Setting, Quality, Duration, Modifying Factors, Severity)  Note limiting factors. John Xiao is a 49 y.o. male who presents to the emergency department for evaluation after having left side chest pain intermittently for the last several days. Pain is focal and on the left side of the chest.  Worsens when he lays down flat or pushes on the area. Has not noticed any change with movement. No worsening with exertion. No associated shortness of breath, cough, or other symptoms. No prior cardiac history. Was seen here a couple weeks ago after having blood in his stool. Evaluation at that time was unremarkable. Went to follow-up with his primary care doctor today and was at his office and told one of the nurses he was having chest pain so they called an ambulance and had him brought here. Also says that today he started having some blood in his urine and has had some intermittent right flank pain which is new today. The chest pain has been intermittent for several days. Typically last a few hours and then goes away on its own. Patient does not have any significant chronic medical problems    HPI    NursingNotes were reviewed. REVIEW OF SYSTEMS    (2-9 systems for level 4, 10 or more for level 5)     Review of Systems   Constitutional: Negative. HENT: Negative. Eyes: Negative. Respiratory: Negative. Cardiovascular:  Positive for chest pain. Gastrointestinal: Negative. Genitourinary:  Positive for hematuria. Musculoskeletal: Negative. Skin: Negative. Neurological: Negative. Hematological: Negative. Psychiatric/Behavioral: Negative. A complete review of systems was performed and is negative except as noted above in the HPI. PAST MEDICAL HISTORY   No past medical history on file. SURGICAL HISTORY     No past surgical history on file. CURRENT MEDICATIONS       Previous Medications    HYDROCORTISONE (ANUSOL-HC) 25 MG SUPPOSITORY    Place 1 suppository rectally 2 times daily       ALLERGIES     Patient has no known allergies. FAMILY HISTORY     No family history on file. SOCIAL HISTORY       Social History     Socioeconomic History    Marital status: Single   Tobacco Use    Smoking status: Never    Smokeless tobacco: Never   Substance and Sexual Activity    Drug use: Yes     Types: Marijuana (Weed)       SCREENINGS    [place] Coma Scale  Eye Opening: Spontaneous  Best Verbal Response: Oriented  Best Motor Response: Obeys commands  Clif Coma Scale Score: 15        PHYSICAL EXAM    (up to 7 for level 4, 8 or more for level 5)     ED Triage Vitals [03/09/23 1343]   BP Temp Temp Source Heart Rate Resp SpO2 Height Weight   (!) 143/99 97.7 °F (36.5 °C) Oral 68 17 100 % 6' 2\" (1.88 m) 210 lb (95.3 kg)       Physical Exam    DIAGNOSTIC RESULTS     EKG: All EKG's are interpreted by the Emergency Department Physician who either signs or Co-signs this chart in the absence of a cardiologist.        RADIOLOGY:   Non-plain film images such as CT, Ultrasound and MRI are read by the radiologist. Plainradiographic images are visualized and preliminarily interpreted by the emergency physician with the below findings:        Interpretation per the Radiologist below, if available at the time of this note:    CT KIDNEY WO CONTRAST   Final Result   1. No hydronephrosis or nephrolithiasis.    2.Colonic diverticulosis without diverticulitis. 3.Cholelithiasis. XR CHEST PORTABLE   Final Result   No radiographic evidence of acute cardiopulmonary disease. ED BEDSIDE ULTRASOUND:   Performed by ED Physician - none    LABS:  Labs Reviewed   CBC WITH AUTO DIFFERENTIAL - Abnormal; Notable for the following components:       Result Value    MCH 31.7 (*)     Neutrophils % 76.1 (*)     Lymphocytes % 16.0 (*)     All other components within normal limits   COMPREHENSIVE METABOLIC PANEL - Abnormal; Notable for the following components:    CO2 19 (*)     Glucose 113 (*)     Creatinine 1.3 (*)     All other components within normal limits   URINALYSIS - Abnormal; Notable for the following components:    Clarity, UA CLOUDY (*)     Ketones, Urine 15 (*)     pH, UA 8.5 (*)     All other components within normal limits   TROPONIN       All other labs were within normal range or not returned as of this dictation.     Medications - No data to display    EMERGENCY DEPARTMENT COURSE and DIFFERENTIALDIAGNOSIS/MDM:   Vitals:    Vitals:    03/09/23 1343   BP: (!) 143/99   Pulse: 68   Resp: 17   Temp: 97.7 °F (36.5 °C)   TempSrc: Oral   SpO2: 100%   Weight: 210 lb (95.3 kg)   Height: 6' 2\" (1.88 m)       MDM  Number of Diagnoses or Management Options  Hematuria, unspecified type: new and does not require workup  Musculoskeletal chest pain: new and does not require workup     Amount and/or Complexity of Data Reviewed  Clinical lab tests: ordered and reviewed  Tests in the radiology section of CPT®: ordered and reviewed  Tests in the medicine section of CPT®: ordered and reviewed  Obtain history from someone other than the patient: yes  Review and summarize past medical records: yes  Independent visualization of images, tracings, or specimens: yes    Risk of Complications, Morbidity, and/or Mortality  Presenting problems: high  Diagnostic procedures: low  Management options: low    Patient Progress  Patient progress: stable      ED Course as of 03/09/23 1612   Thu Mar 09, 2023   1425 EKG shows sinus rhythm with a rate of 62. Inferior T wave inversions in lead III and aVF. No ST elevation or depression. Normal intervals  No prior for comparison [DIPTI]      ED Course User Index  [DIPTI] Destiny Giraldo MD       No signs of infection or continued hematuria on urinalysis. No signs of cardiac ischemia on evaluation here. Pain is reproducible with palpation of the left pectoral muscle consistent with musculoskeletal chest pain. No findings to suggest cardiac ischemia, PE, or other concerning etiology of the chest pain    Evaluation and work-up here revealed no signs of emergent or life-threatening pathology that would necessitate admission for further work-up or management at this time. Patient is felt to be stable for discharge home with return precautions for worsening of the condition or development of new concerning symptoms. Patient was encouraged to follow-up with their primary care doctor in the appropriate timeframe. Necessary prescriptions and information have been provided for treatment at home. Patient voices understanding and agreement with the plan. CONSULTS:  None    PROCEDURES:  Unless otherwise notedbelow, none     Procedures      FINAL IMPRESSION     1. Musculoskeletal chest pain    2. Hematuria, unspecified type          DISPOSITION/PLAN   DISPOSITION Decision To Discharge 03/09/2023 03:42:12 PM      No notes of EC Admission Criteria type on file.     PATIENT REFERRED TO:  [facility] EMERGENCY DEPT  Robert Fiore  [phone]    If symptoms worsen    Chip Alarcon  [phone]    Schedule an appointment as soon as possible for a visit       DISCHARGE MEDICATIONS:  New Prescriptions    ASPIRIN EC 81 MG EC TABLET    Take 1 tablet by mouth daily    CYCLOBENZAPRINE (FLEXERIL) 10 MG TABLET    Take 1 tablet by mouth 2 times daily as needed for Muscle spasms    NAPROXEN (NAPROSYN) 500 MG TABLET    Take 1 tablet by mouth 2 times daily (with meals) for 7 days          (Please note that portions of this note were completed with a voice recognition program.  Efforts were made to edit the dictations butoccasionally words are mis-transcribed.)    Andres Mendoza MD (electronically signed)  AttendingEmergency Physician          Andres Mendoza., MD  03/09/23 7090

## 2023-03-10 LAB
EKG P AXIS: 31 DEGREES
EKG P-R INTERVAL: 158 MS
EKG Q-T INTERVAL: 412 MS
EKG QRS DURATION: 102 MS
EKG QTC CALCULATION (BAZETT): 413 MS
EKG T AXIS: -7 DEGREES

## 2023-03-10 PROCEDURE — 93010 ELECTROCARDIOGRAM REPORT: CPT | Performed by: INTERNAL MEDICINE

## 2023-03-14 ENCOUNTER — OFFICE VISIT (OUTPATIENT)
Dept: FAMILY MEDICINE CLINIC | Facility: CLINIC | Age: 48
End: 2023-03-14
Payer: COMMERCIAL

## 2023-03-14 VITALS
BODY MASS INDEX: 26.31 KG/M2 | DIASTOLIC BLOOD PRESSURE: 88 MMHG | WEIGHT: 211.6 LBS | HEART RATE: 74 BPM | RESPIRATION RATE: 18 BRPM | SYSTOLIC BLOOD PRESSURE: 142 MMHG | OXYGEN SATURATION: 99 % | HEIGHT: 75 IN

## 2023-03-14 DIAGNOSIS — K59.00 CONSTIPATION, UNSPECIFIED CONSTIPATION TYPE: ICD-10-CM

## 2023-03-14 DIAGNOSIS — Z79.1 NSAID LONG-TERM USE: ICD-10-CM

## 2023-03-14 DIAGNOSIS — R07.89 ATYPICAL CHEST PAIN: ICD-10-CM

## 2023-03-14 DIAGNOSIS — Z86.16 HISTORY OF COVID-19: ICD-10-CM

## 2023-03-14 DIAGNOSIS — Z12.11 ENCOUNTER FOR SCREENING FOR MALIGNANT NEOPLASM OF COLON: ICD-10-CM

## 2023-03-14 DIAGNOSIS — R10.9 ABDOMINAL PAIN, UNSPECIFIED ABDOMINAL LOCATION: ICD-10-CM

## 2023-03-14 PROBLEM — Z79.01 ANTICOAGULATED: Status: ACTIVE | Noted: 2023-03-14

## 2023-03-14 PROCEDURE — 99214 OFFICE O/P EST MOD 30 MIN: CPT | Performed by: FAMILY MEDICINE

## 2023-03-14 RX ORDER — NAPROXEN 500 MG/1
500 TABLET ORAL
COMMUNITY
Start: 2023-03-09 | End: 2023-03-17

## 2023-03-14 RX ORDER — CYCLOBENZAPRINE HCL 10 MG
10 TABLET ORAL
COMMUNITY
Start: 2023-03-09 | End: 2023-03-20

## 2023-03-14 RX ORDER — ASPIRIN 81 MG/1
1 TABLET ORAL DAILY
COMMUNITY
Start: 2023-03-09

## 2023-03-14 NOTE — TELEPHONE ENCOUNTER
Lilliana, at the  came back to tell me that there was a patient that was SOB & shaking.  She said that he was c/o chest pain as well.  I immediately went to get the patient and put him in Room #4.  I put a pulse ox monitor on his finger and got a bp cuff on his left arm.  He was very anxious & sweaty.  He was hyperventilating and then would focus on either myself of Rupert, the other nurse and calm down.  While we were getting vitals, one of the other nurses got oxygen and we put a nasal cannula on Raad and continued to try to calm him down.  The first responders were called & he was taken to hospital in ambulance.

## 2023-03-14 NOTE — PROGRESS NOTES
Subjective   Raad Carvajal is a 47 y.o. male presenting with chief complaint of:   Chief Complaint   Patient presents with   • Abdominal Pain     Patient states that he is here today for FU visit        History of Present Illness :  Alone.  Here for primarily f/u last week.  Came her and mentioned chest pain.  At that point ambulance was called and patient was sent to [facility].  Additionally had recent constipation/rectal bleeding.  [facility] CT [facility]/ER showed cholelithiasis.  Chest pain was felt to be muscular.       Has few chronic problems to consider that might have a bearing on today's issues; not an interval appointment.       Chronic/acute problems reviewed today:   1. Abdominal pain, unspecified abdominal location: was LLQ (denies RUQ).     2. Constipation, unspecified constipation type: recent/worse and since improved   3. NSAID long-term use: Chronic use of NSAID.  Use is occ/frequent.   Per ROS/denies issues with use.   Has been warned of potential for GI toxicity (ulcers, bleeding), renal toxicity (even renal failure), liver injury, interference with control blood pressure and increased risk CV disease in general.  Advised as little use as able.     4. History of COVID-19: he ? Related; cannot recall how far back he had (not recent)   5. Encounter for screening for malignant neoplasm of colon : .due colonoscopy for age alone now plus the constipation.    6. Atypical chest pain: above; since resolved.  No chest rash.      Has an/another acute issue today: none.    The following portions of the patient's history were reviewed and updated as appropriate: allergies, current medications, past family history, past medical history, past social history, past surgical history and problem list.    Current Outpatient Medications:   •  aspirin 81 MG EC tablet, Take 1 tablet by mouth Daily., Disp: , Rfl:   •  cyclobenzaprine (FLEXERIL) 10 MG tablet, Take 1 tablet by mouth., Disp: , Rfl:   •  naproxen (NAPROSYN) 500  MG tablet, Take 1 tablet by mouth., Disp: , Rfl:   •  Anucort-HC 25 MG suppository, INSERT 1 SUPPOSITORY RECTALLY TWICE DAILY (Patient not taking: Reported on 3/14/2023), Disp: , Rfl:   •  hydrocortisone (ANUSOL-HC) 25 MG suppository, Insert 1 suppository into the rectum. (Patient not taking: Reported on 3/14/2023), Disp: , Rfl:     No problems with medications.    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  ENDO:  No syncope, near or diaphoretic sweaty spells.  HEENT: No head injury occ/same headache.   No vision change.  No hearing loss.  Ears without pain/drainage.  No sore throat.  No change occ nasal/sinus congestion/drainage. No epistaxis.  CHEST: No chest wall tenderness or mass. No change/still smoking occ cough,  without wheeze, SOB; no hemoptysis.  CV: No exertional chest pain, palpatations, ankle edema.  GI: No heartburn,or dysphagia.  No abdominal pain,  rectal bleeding, or melena; recent on/off looser stools.    :  Voids without dysuria, or incontience to completion.  ORTHO: No painful/swollen joints but various on /off sore.  No change daily sore neck or back.  No acute neck or back pain without recent injury.   NEURO: No dizziness, weakness of extremities.  No numbness/parethesias.   PSYCH: No memory loss.  Mood good; occ anxious, depressed but/and not suicidal.  Tolerated stress.      Screening:  Mammogram: NA  Bone density: NA  Low dose CT chest: Tobacco-smoker/age 24/1 ppd (20): NA  GI: NA  Prostate: NA  Usual lab order  NA    Copy/paste function used for ROS/exam AND each area of these were reviewed, updated, confirmed and supplemented as needed.  Data reviewed:   Recent admit/ER/MD visits: ER visit/care everywhere    Last cardiac testing:   Echo: none    u Mar 09, 2023   1425 EKG shows sinus rhythm with a rate of 62. Inferior T wave inversions in lead III and aVF. No ST elevation or depression.  "Normal intervals  No prior for comparison [[id number]]       Radiology considered:   CT KIDNEY WO CONTRAST  Result Date: 3/9/2023  1.No hydronephrosis or nephrolithiasis. 2.Colonic diverticulosis without diverticulitis. 3.Cholelithiasis.    XR CHEST PORTABLE  Result Date: 3/9/2023  No radiographic evidence of acute cardiopulmonary disease.    Lab Results:  Results for orders placed or performed in visit on 10/31/22   POCT VERITOR SARS-CoV-2 Antigen    Specimen: Nasopharynx; Swab   Result Value Ref Range    SARS Antigen Detected (A) Not Detected, Presumptive Negative    Internal Control Passed Passed    Lot Number [id number]     Expiration Date 05/30/2023    POC Influenza A / B    Specimen: Swab   Result Value Ref Range    Rapid Influenza A Ag Negative Negative    Rapid Influenza B Ag Negative Negative    Internal Control Passed Passed    Lot Number [id number]     Expiration Date 10/20/2023      CBC:  Lab Results - Last 18 Months   Lab Units 03/09/23  1352 02/26/23  0822   WBC K/uL 8.9 11.3*   HEMOGLOBIN g/dL 15.3 16.8   HEMATOCRIT % 43.8 49.0   PLATELETS K/uL 265 253        Objective   /88 (BP Location: Right arm, Patient Position: Sitting, Cuff Size: Adult)   Pulse 74   Resp 18   Ht 190.5 cm (75\")   Wt 96 kg (211 lb 9.6 oz)   SpO2 99%   BMI 26.45 kg/m²   Body mass index is 26.45 kg/m².    Recent Vitals       10/31/2022 3/2/2023 3/14/2023       BP: -- 130/76 142/88     Pulse: 80 65 74     Temp: 100.2 °F (37.9 °C) 97.3 °F (36.3 °C) --     Weight: 93.4 kg (206 lb) 95.1 kg (209 lb 9.6 oz) 96 kg (211 lb 9.6 oz)     BMI (Calculated): 25.7 26.2 26.4         Wt Readings from Last 15 Encounters:   03/14/23 1305 96 kg (211 lb 9.6 oz)   03/02/23 0839 95.1 kg (209 lb 9.6 oz)   10/31/22 0909 93.4 kg (206 lb)   10/04/22 1007 93.4 kg (206 lb)   03/18/22 0826 93.9 kg (207 lb)   03/07/22 0831 91.6 kg (202 lb)   04/26/21 1514 91.2 kg (201 lb)   03/25/20 1031 81.6 kg (180 lb)   03/09/20 0836 81.6 kg (180 lb)   05/10/17 1035 81.2 kg " (179 lb)       Physical Exam  GENERAL:  Well nourished/developed in no acute distress. Thin  SKIN: Turgor excellent, without wound, rash, lesion.  HEENT: Normal cephalic without trauma.  Pupils equal round reactive to light. Extraocular motions full without nystagmus.    No thyroidmegaly, mass, tenderness, lymphadenopathy and supple.  CV: Regular rhythm.  No murmur, gallop, edema. Posterior pulses intact.  No carotid bruits.  CHEST: No chest wall tenderness or mass.   LUNGS: Symmetric motion with clear to auscultation.   ABD: Soft, nontender without mass.   ORTHO: Symmetric extremities without swelling/point tenderness.  Full gross range of motion.  NEURO: CN 2-12 grossly intact.  Symmetric facies.  UE/LE   4/5 strength throughout.  Nonfocal use extremities. Speech clear.    PSYCH: Oriented x 3.  Pleasant calm, well kept.  Purposeful/directed conservation with intact short/long gross memory.       Assessment & Plan     1. Abdominal pain, unspecified abdominal location    2. Constipation, unspecified constipation type    3. NSAID long-term use    4. History of COVID-19    5. Encounter for screening for malignant neoplasm of colon    6. Atypical chest pain      Data review above:   Discussions/medical decisions/reviews:  BP ok/pattern for now  Other vitals ok    Screening reviewed/updated today  Vaccines discussed; none  Cholelithiasis discussed (found incidential  CT); vague on any RUQ pain/like.  Suggested his age; best to remove to forgo later complications (wants to discuss with father)  Colonoscopy    Data review above:   Rx: reviewed and decisions:   Rx new/changes: avoid NSAID  No orders of the defined types were placed in this encounter.    Orders placed:   LAB/Testing/Referrals: reviewed/orders:   Today:   Orders Placed This Encounter   Procedures   • Ambulatory Referral to Gastroenterology     Chronic/recurrent labs above or change to:   Same       There is no immunization history on file for this  "patient.  We advised/reaffirmed our support/suggestion for staying complete with covid- covid boosters, seasonal flu/yearly and any missing vaccine from list we supplied; we suggest contact with local health department office to review missing/needed vaccines and then bring nursing documentation for these vaccines to this office or call this information in. Shingles became \"free\" 1.1.23.     Health maintenance:   Body mass index is 26.45 kg/m².  BMI is >= 25 and <30. (Overweight) The following options were offered after discussion;: exercise counseling/recommendations and nutrition counseling/recommendations      Tobacco use reviewed:   Raad Carvajal  reports that he has been smoking cigarettes. He started smoking about 24 years ago. He has been smoking an average of 1 pack per day. He has never used smokeless tobacco.. I have educated him on the risk of diseases from using tobacco products such as cancer, COPD and heart disease.     I advised him to quit and he is not willing to quit.    I spent 1 minutes counseling the patient.       Reminded; as discussed before.     Follow up: Return for yearly.  Future Appointments   Date Time Provider Department Center   3/25/2024  9:15 AM Jose Angel Shankar MD [facility]             "